# Patient Record
Sex: MALE | Race: WHITE | Employment: FULL TIME | URBAN - METROPOLITAN AREA
[De-identification: names, ages, dates, MRNs, and addresses within clinical notes are randomized per-mention and may not be internally consistent; named-entity substitution may affect disease eponyms.]

---

## 2017-10-05 ENCOUNTER — OFFICE VISIT (OUTPATIENT)
Dept: INTERNAL MEDICINE CLINIC | Facility: CLINIC | Age: 52
End: 2017-10-05

## 2017-10-05 ENCOUNTER — LAB ENCOUNTER (OUTPATIENT)
Dept: LAB | Age: 52
End: 2017-10-05
Attending: FAMILY MEDICINE
Payer: COMMERCIAL

## 2017-10-05 VITALS
TEMPERATURE: 99 F | HEIGHT: 68.5 IN | BODY MASS INDEX: 30.71 KG/M2 | OXYGEN SATURATION: 98 % | DIASTOLIC BLOOD PRESSURE: 70 MMHG | RESPIRATION RATE: 13 BRPM | SYSTOLIC BLOOD PRESSURE: 130 MMHG | HEART RATE: 58 BPM | WEIGHT: 205 LBS

## 2017-10-05 DIAGNOSIS — Z12.5 SPECIAL SCREENING, PROSTATE CANCER: ICD-10-CM

## 2017-10-05 DIAGNOSIS — Z11.59 NEED FOR HEPATITIS C SCREENING TEST: ICD-10-CM

## 2017-10-05 DIAGNOSIS — R07.89 FEELING OF CHEST TIGHTNESS: Primary | ICD-10-CM

## 2017-10-05 DIAGNOSIS — R03.0 ELEVATED BLOOD PRESSURE READING: ICD-10-CM

## 2017-10-05 DIAGNOSIS — Z87.891 FORMER SMOKER: ICD-10-CM

## 2017-10-05 PROBLEM — I10 ESSENTIAL HYPERTENSION: Status: ACTIVE | Noted: 2017-10-05

## 2017-10-05 PROBLEM — I10 ESSENTIAL HYPERTENSION: Status: RESOLVED | Noted: 2017-10-05 | Resolved: 2017-10-05

## 2017-10-05 PROCEDURE — 84153 ASSAY OF PSA TOTAL: CPT | Performed by: FAMILY MEDICINE

## 2017-10-05 PROCEDURE — 80053 COMPREHEN METABOLIC PANEL: CPT | Performed by: FAMILY MEDICINE

## 2017-10-05 PROCEDURE — 36415 COLL VENOUS BLD VENIPUNCTURE: CPT | Performed by: FAMILY MEDICINE

## 2017-10-05 PROCEDURE — 86803 HEPATITIS C AB TEST: CPT | Performed by: FAMILY MEDICINE

## 2017-10-05 PROCEDURE — 99213 OFFICE O/P EST LOW 20 MIN: CPT | Performed by: FAMILY MEDICINE

## 2017-10-05 PROCEDURE — 85025 COMPLETE CBC W/AUTO DIFF WBC: CPT | Performed by: FAMILY MEDICINE

## 2017-10-05 PROCEDURE — 80061 LIPID PANEL: CPT | Performed by: FAMILY MEDICINE

## 2017-10-05 RX ORDER — CLINDAMYCIN AND BENZOYL PEROXIDE 10; 50 MG/G; MG/G
GEL TOPICAL
COMMUNITY
Start: 2017-09-26 | End: 2019-02-06

## 2017-10-05 NOTE — PROGRESS NOTES
HPI:    Patient ID: Dragan Dave is a 47year old male.     HPI  Former pt who comes in for BP ck   [facility]   156/60 last time   Has cked at home   140/80 range   Has 2 cup coffee in the AM  Not today though [E]    Meds This Visit:    No prescriptions requested or ordered in this encounter       Imaging & Referrals:  XR CHEST PA + LAT CHEST (CPT=71020)  CARD ECHO STRESS ECHO/REST AND STRESS (CPT=93351)       RI#[id number]

## 2017-10-26 ENCOUNTER — HOSPITAL ENCOUNTER (OUTPATIENT)
Dept: GENERAL RADIOLOGY | Facility: HOSPITAL | Age: 52
Discharge: HOME | End: 2017-10-26
Attending: FAMILY MEDICINE
Payer: COMMERCIAL

## 2017-10-26 ENCOUNTER — HOSPITAL ENCOUNTER (OUTPATIENT)
Dept: CV DIAGNOSTICS | Facility: HOSPITAL | Age: 52
Discharge: HOME | End: 2017-10-26
Attending: FAMILY MEDICINE
Payer: COMMERCIAL

## 2017-10-26 DIAGNOSIS — R03.0 ELEVATED BLOOD PRESSURE READING: ICD-10-CM

## 2017-10-26 DIAGNOSIS — R07.89 FEELING OF CHEST TIGHTNESS: ICD-10-CM

## 2017-10-26 DIAGNOSIS — Z11.59 NEED FOR HEPATITIS C SCREENING TEST: ICD-10-CM

## 2017-10-26 PROCEDURE — 93018 CV STRESS TEST I&R ONLY: CPT | Performed by: FAMILY MEDICINE

## 2017-10-26 PROCEDURE — 93350 STRESS TTE ONLY: CPT | Performed by: FAMILY MEDICINE

## 2017-10-26 PROCEDURE — 71020 XR CHEST PA + LAT CHEST (CPT=71020): CPT | Performed by: FAMILY MEDICINE

## 2017-10-26 PROCEDURE — 93017 CV STRESS TEST TRACING ONLY: CPT | Performed by: FAMILY MEDICINE

## 2017-11-20 ENCOUNTER — OFFICE VISIT (OUTPATIENT)
Dept: INTERNAL MEDICINE CLINIC | Facility: CLINIC | Age: 52
End: 2017-11-20

## 2017-11-20 VITALS
HEART RATE: 60 BPM | OXYGEN SATURATION: 98 % | HEIGHT: 68.25 IN | BODY MASS INDEX: 31.67 KG/M2 | SYSTOLIC BLOOD PRESSURE: 130 MMHG | TEMPERATURE: 98 F | DIASTOLIC BLOOD PRESSURE: 84 MMHG | RESPIRATION RATE: 16 BRPM | WEIGHT: 209 LBS

## 2017-11-20 DIAGNOSIS — Z00.00 WELLNESS EXAMINATION: Primary | ICD-10-CM

## 2017-11-20 DIAGNOSIS — R06.02 SOB (SHORTNESS OF BREATH): ICD-10-CM

## 2017-11-20 DIAGNOSIS — R20.0 NUMBNESS OF LEFT ANTERIOR THIGH: ICD-10-CM

## 2017-11-20 DIAGNOSIS — N52.9 ERECTILE DYSFUNCTION, UNSPECIFIED ERECTILE DYSFUNCTION TYPE: ICD-10-CM

## 2017-11-20 DIAGNOSIS — R03.0 ELEVATED BLOOD PRESSURE READING WITHOUT DIAGNOSIS OF HYPERTENSION: ICD-10-CM

## 2017-11-20 DIAGNOSIS — M21.612 BUNION, LEFT: ICD-10-CM

## 2017-11-20 DIAGNOSIS — R07.89 CHEST PRESSURE: ICD-10-CM

## 2017-11-20 DIAGNOSIS — I44.7 LBBB (LEFT BUNDLE BRANCH BLOCK): ICD-10-CM

## 2017-11-20 PROCEDURE — 99396 PREV VISIT EST AGE 40-64: CPT | Performed by: FAMILY MEDICINE

## 2017-11-20 PROCEDURE — 99213 OFFICE O/P EST LOW 20 MIN: CPT | Performed by: FAMILY MEDICINE

## 2017-11-20 RX ORDER — SILDENAFIL 100 MG/1
100 TABLET, FILM COATED ORAL
Qty: 10 TABLET | Refills: 1 | Status: SHIPPED | OUTPATIENT
Start: 2017-11-20 | End: 2019-02-06

## 2017-11-20 NOTE — PROGRESS NOTES
HPI:    Patient ID: Pat Rahman is a 47year old male.     HPI  Pat Rahman is a 47year old male who presents for a complete physical exam.   HPI:       Wt Readings from Last 6 Encounters:  11/20/17 : 209 lb  10/05/17 : 205 lb  11/25/14 : 201 lb 4 issues w staying hard    Desire is there     MUSCULOSKELETAL: denies back pain at present but has had in the past     Has L foot bunion  Does not bother him much though  woyld like name of podiatry     NEURO: denies headaches  PSYCHE: denies depression or Current Outpatient Prescriptions:  Sildenafil Citrate 100 MG Oral Tab Take 1 tablet (100 mg total) by mouth daily as needed for Erectile Dysfunction.  Disp: 10 tablet Rfl: 1   Clindamycin Phos-Benzoyl Perox 1-5 % External Gel  Disp:  Rfl:      Allergies

## 2017-11-28 ENCOUNTER — CHARTING TRANS (OUTPATIENT)
Dept: OTHER | Age: 52
End: 2017-11-28

## 2017-11-28 ASSESSMENT — PAIN SCALES - GENERAL: PAINLEVEL_OUTOF10: 5

## 2018-01-15 ENCOUNTER — OFFICE VISIT (OUTPATIENT)
Dept: FAMILY MEDICINE CLINIC | Facility: CLINIC | Age: 53
End: 2018-01-15

## 2018-01-15 VITALS
WEIGHT: 209 LBS | DIASTOLIC BLOOD PRESSURE: 90 MMHG | BODY MASS INDEX: 31.31 KG/M2 | OXYGEN SATURATION: 99 % | RESPIRATION RATE: 20 BRPM | HEART RATE: 69 BPM | SYSTOLIC BLOOD PRESSURE: 142 MMHG | TEMPERATURE: 99 F | HEIGHT: 68.5 IN

## 2018-01-15 DIAGNOSIS — J06.9 VIRAL URI: Primary | ICD-10-CM

## 2018-01-15 DIAGNOSIS — L21.9 SEBORRHEIC DERMATITIS OF SCALP: ICD-10-CM

## 2018-01-15 PROCEDURE — 99213 OFFICE O/P EST LOW 20 MIN: CPT | Performed by: PHYSICIAN ASSISTANT

## 2018-01-15 RX ORDER — KETOCONAZOLE 20 MG/ML
SHAMPOO TOPICAL
Qty: 1 BOTTLE | Refills: 1 | Status: SHIPPED | OUTPATIENT
Start: 2018-01-15 | End: 2019-02-06

## 2018-01-16 NOTE — PATIENT INSTRUCTIONS
Viral Upper Respiratory Illness (Adult)  You have a viral upper respiratory illness (URI), which is another term for the common cold. This illness is contagious during the first few days. It is spread through the air by coughing and sneezing.  It may also Call your healthcare provider right away if any of these occur:  · Cough with lots of colored sputum (mucus)  · Severe headache; face, neck, or ear pain  · Difficulty swallowing due to throat pain  · Fever of 100.4°F (38°C) or higher, or as directed by you You may also have acne, inflamed eyelids (blepharitis), or other skin conditions at the same time. Treatment for seborrheic dermatitis  Treatment can reduce symptoms for a period of time.  The types of treatments most often used include:  · Antifungal sham

## 2018-01-16 NOTE — PROGRESS NOTES
CHIEF COMPLAINT:   Patient presents with:  Nasal Congestion: sinus pressure,itchy throat and post nasal drip x 1 day   note for work    HPI:   Sai Gonzáles is a 47year old male who presents for upper and lower respiratory symptoms for  1 days.  Patient /90   Pulse 69   Temp 98.7 °F (37.1 °C) (Oral)   Resp 20   Ht 68.5\"   Wt 209 lb   SpO2 99%   BMI 31.32 kg/m²   GENERAL: well developed, well nourished, and in no apparent distress  SCALP: erythematous papules, some excoriated with overlying yellow c You have a viral upper respiratory illness (URI), which is another term for the common cold. This illness is contagious during the first few days. It is spread through the air by coughing and sneezing.  It may also be spread by direct contact (touching the · Cough with lots of colored sputum (mucus)  · Severe headache; face, neck, or ear pain  · Difficulty swallowing due to throat pain  · Fever of 100.4°F (38°C) or higher, or as directed by your healthcare provider  Call 911  Call 911 if any of these occur: Treatment for seborrheic dermatitis  Treatment can reduce symptoms for a period of time.  The types of treatments most often used include:  · Antifungal shampoo, body wash, or cream. These contain medicines such as ketoconazole, fluconazole, selenium sulfid

## 2018-05-21 ENCOUNTER — OFFICE VISIT (OUTPATIENT)
Dept: FAMILY MEDICINE CLINIC | Facility: CLINIC | Age: 53
End: 2018-05-21

## 2018-05-21 VITALS
TEMPERATURE: 99 F | HEART RATE: 89 BPM | HEIGHT: 66.5 IN | BODY MASS INDEX: 33.19 KG/M2 | SYSTOLIC BLOOD PRESSURE: 130 MMHG | OXYGEN SATURATION: 98 % | RESPIRATION RATE: 20 BRPM | WEIGHT: 209 LBS | DIASTOLIC BLOOD PRESSURE: 82 MMHG

## 2018-05-21 DIAGNOSIS — J06.9 VIRAL UPPER RESPIRATORY INFECTION: ICD-10-CM

## 2018-05-21 DIAGNOSIS — L21.9 SEBORRHEIC DERMATITIS OF SCALP: Primary | ICD-10-CM

## 2018-05-21 PROCEDURE — 99213 OFFICE O/P EST LOW 20 MIN: CPT | Performed by: NURSE PRACTITIONER

## 2018-05-21 RX ORDER — KETOCONAZOLE 20 MG/ML
10 SHAMPOO TOPICAL
Qty: 1 BOTTLE | Refills: 0 | Status: SHIPPED | OUTPATIENT
Start: 2018-05-21 | End: 2018-06-20

## 2018-05-21 NOTE — PATIENT INSTRUCTIONS
Understanding Seborrheic Dermatitis    Seborrheic dermatitis is a common type of rash that causes red, scaly, greasy skin.  It occurs on skin that has oil glands, such as the face, scalp, and upper chest. It tends to last a long time, or go away and come Wash your skin gently. You can remove scales with oil and gentle rubbing or a brush. Living with seborrheic dermatitis  Seborrheic dermatitis is an ongoing (chronic) condition. It can go away and then come back.  You will likely need to use shampoo, cream, · You may use acetaminophen or ibuprofen to control pain and fever, unless another medicine was prescribed. If you have chronic liver or kidney disease, have ever had a stomach ulcer or gastrointestinal bleeding, or are taking blood-thinning medicines, clovis

## 2018-05-21 NOTE — PROGRESS NOTES
CHIEF COMPLAINT:   Patient presents with:  URI: water eyes, sinus pressure x 2 days    HPI:   Marlo Pate is a 46year old male who presents for ill symptoms for 2 days.  Patient reports sore throat only at the beginning of sx's, congestion, OTC cold me Scalp: erythematous, some excoriated with overlying yellow crusting present throughout small portions of scalp, no edema, warmth, active drainage, or tenderness to palpation. HEENT: atraumatic, normocephalic. conjunctiva clear.  TM's gray, no bulging, no Risks, benefits, and side effects of medication explained and discussed. Patient Instructions       Understanding Seborrheic Dermatitis    Seborrheic dermatitis is a common type of rash that causes red, scaly, greasy skin.  It occurs on skin that has oil · Shampoo or cream with other medicines. These contain medicines such as coal tar, salicylic acid, or zinc pyrithione. · Sodium sulfacetemide creams and washes. These may also help. Wash your skin gently.  You can remove scales with oil and gentle rubbing · You may use acetaminophen or ibuprofen to control pain and fever, unless another medicine was prescribed. If you have chronic liver or kidney disease, have ever had a stomach ulcer or gastrointestinal bleeding, or are taking blood-thinning medicines, clovis

## 2018-11-02 VITALS
SYSTOLIC BLOOD PRESSURE: 130 MMHG | DIASTOLIC BLOOD PRESSURE: 78 MMHG | HEART RATE: 74 BPM | TEMPERATURE: 98.2 F | RESPIRATION RATE: 20 BRPM

## 2019-02-06 ENCOUNTER — OFFICE VISIT (OUTPATIENT)
Dept: FAMILY MEDICINE CLINIC | Facility: CLINIC | Age: 54
End: 2019-02-06
Payer: COMMERCIAL

## 2019-02-06 VITALS
DIASTOLIC BLOOD PRESSURE: 70 MMHG | SYSTOLIC BLOOD PRESSURE: 130 MMHG | BODY MASS INDEX: 33 KG/M2 | WEIGHT: 207.38 LBS | RESPIRATION RATE: 20 BRPM | OXYGEN SATURATION: 97 % | TEMPERATURE: 100 F | HEART RATE: 86 BPM

## 2019-02-06 DIAGNOSIS — J11.1 INFLUENZA-LIKE ILLNESS: Primary | ICD-10-CM

## 2019-02-06 DIAGNOSIS — L70.8 OTHER ACNE: ICD-10-CM

## 2019-02-06 DIAGNOSIS — L21.9 SEBORRHEIC DERMATITIS OF SCALP: ICD-10-CM

## 2019-02-06 PROCEDURE — 99213 OFFICE O/P EST LOW 20 MIN: CPT | Performed by: NURSE PRACTITIONER

## 2019-02-06 RX ORDER — CLINDAMYCIN AND BENZOYL PEROXIDE 10; 50 MG/G; MG/G
1 GEL TOPICAL 2 TIMES DAILY
Qty: 50 G | Refills: 0 | Status: SHIPPED | OUTPATIENT
Start: 2019-02-06 | End: 2019-02-20

## 2019-02-06 RX ORDER — KETOCONAZOLE 20 MG/ML
30 SHAMPOO TOPICAL
Qty: 1 BOTTLE | Refills: 1 | Status: SHIPPED | OUTPATIENT
Start: 2019-02-07 | End: 2019-04-04

## 2019-02-06 NOTE — PATIENT INSTRUCTIONS
Humidifier in room  Sleep propped  Push fluids  Limit dairy  Mucinex as directed  Sudafed as needed  Follow up for worsening symptoms or no improvement    Understanding Seborrheic Dermatitis    Seborrheic dermatitis is a common type of rash that causes r · Shampoo or cream with other medicines. These contain medicines such as coal tar, salicylic acid, or zinc pyrithione. · Sodium sulfacetemide creams and washes. These may also help. Wash your skin gently.  You can remove scales with oil and gentle rubbing 2. Follow the treatment plan advised by your healthcare provider. It usually takes 2 to 3 months to see a result. 3. Switching from oil-based to water-based makeup may improve acne in girls.   Follow-up care  Follow up with your healthcare provider, or as · Nausea and loss of appetite are common with the flu. Eat light meals. Drink 6 to 8 glasses of liquids every day. Good choices are water, sport drinks, soft drinks without caffeine, juices, tea, and soup.  Extra fluids will also help loosen secretions in y

## 2019-02-06 NOTE — PROGRESS NOTES
Patient presents with:  Flu: headache, chills, slight bodyaches, cough, face congestion, x 1 day   :    HPI:   Richie Veras is a 53year old male who presents for upper respiratory symptoms for  10  days. Started suddenly.   Symptoms have been worsening /70 (BP Location: Left arm, Patient Position: Sitting, Cuff Size: large)   Pulse 86   Temp 100.2 °F (37.9 °C) (Oral)   Resp 20   Wt 207 lb 6.4 oz   SpO2 97%   BMI 32.97 kg/m²   GENERAL: well developed, well nourished, in no apparent distress, acutely The patient is asked to f/u with PCP in 3-4 days if sx's persist. Seek immediate medical attention for acute or worsening symptoms. The patient indicates understanding of these issues and agrees to the plan.     Meds & Refills for this Visit:  Requested Pre · On the scalp, also known as dandruff  · On the upper chest  You may also have acne, inflamed eyelids (blepharitis), or other skin conditions at the same time. Treatment for seborrheic dermatitis  Treatment can reduce symptoms for a period of time.  The t Acne is an inflammatory condition of the skin. During adolescence (especially in boys) there is an increase in production of skin oils on the face, neck, chest, upper back, and upper arms.  These oils can block hair follicles and allow an overgrowth of norm Influenza is also called the flu. It is a viral illness that affects the air passages of your lungs. It is different from the common cold. The flu can easily be passed from one to person to another.  It may be spread through the air by coughing and sneezing If you are age 72 or older, talk with your provider about getting a pneumococcal vaccine every 5 years. You should also get this vaccine if you have chronic asthma or COPD. All adults should get a flu vaccine every fall. Ask your provider about this.   When

## 2020-04-20 ENCOUNTER — VIRTUAL PHONE E/M (OUTPATIENT)
Dept: INTERNAL MEDICINE CLINIC | Facility: CLINIC | Age: 55
End: 2020-04-20
Payer: COMMERCIAL

## 2020-04-20 ENCOUNTER — TELEPHONE (OUTPATIENT)
Dept: INTERNAL MEDICINE CLINIC | Facility: CLINIC | Age: 55
End: 2020-04-20

## 2020-04-20 DIAGNOSIS — Z20.822 SUSPECTED 2019 NOVEL CORONAVIRUS INFECTION: Primary | ICD-10-CM

## 2020-04-20 PROCEDURE — 99441 PHONE E/M BY PHYS 5-10 MIN: CPT | Performed by: FAMILY MEDICINE

## 2020-04-20 NOTE — TELEPHONE ENCOUNTER
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

## 2020-04-20 NOTE — PROGRESS NOTES
Virtual Telephone Check-In    Guero Paulino verbally consents to a Virtual/Telephone Check-In visit on 04/20/20. Patient understands and accepts financial responsibility for any deductible, co-insurance and/or co-pays associated with this service. Imaging & Consults:  None      1. Self quarantine at home for the next 7-14 days or until 3 days fever free without fever reducing medications; whichever is longer. 2. Take acetaminophen for fever or body aches 650 mg every 6 hours as needed.  Be mcginnis

## 2020-04-20 NOTE — TELEPHONE ENCOUNTER
Patient called and stated that he had a headache, fever and chills that started on Sunday. He stated that his job policy is that if you are showing symptoms that he would get 3 days off work. He doesn't know if he should really be off of work for 2 weeks.

## 2020-04-27 ENCOUNTER — TELEPHONE (OUTPATIENT)
Dept: INTERNAL MEDICINE CLINIC | Facility: CLINIC | Age: 55
End: 2020-04-27

## 2020-04-27 NOTE — TELEPHONE ENCOUNTER
Patient dropped off paperwork for FMLA at our office door for Bianca Macedo to complete; Fri 4/24/20 he is calling for status of receiving it? Please check Chelsey's in box to see if she has received to be completed and callback patient.   Once completed he st

## 2020-04-27 NOTE — TELEPHONE ENCOUNTER
Left detailed voicemail to verify Rolando Wasserman did receive FMLA paperwork it is on her desk waiting to be completed; did not verify time to complete but did confirm we would call him when scanned into his Patient Feedt for him to print

## 2022-12-18 ENCOUNTER — TELEPHONE (OUTPATIENT)
Dept: FAMILY MEDICINE CLINIC | Facility: CLINIC | Age: 57
End: 2022-12-18

## 2022-12-18 NOTE — TELEPHONE ENCOUNTER
FYI- On call message received today at 6 am. Called coroners office back at 8 am. Patient passed away last night. Patient needed to know last office visit date and past medical history- information was provided (last physical exam 2017 with Dr. Karey Landers, telemedicine [facility] visit with Sally Sharpe NP 2017).

## (undated) NOTE — LETTER
Date: 5/21/2018    Patient Name: Marco Ruano          To Whom it may concern: The above patient was seen at the [facility] for treatment of a medical condition. This patient should be excused from attending work on 5/21.     The ciro

## (undated) NOTE — LETTER
Date: 2/6/2019    Patient Name: Dragan Dave          To Whom it may concern: This letter has been written at the patient's request. The above patient was seen at the [facility] for treatment of a medical condition.     The patient may r

## (undated) NOTE — LETTER
April 20, 2020    Patient: Megha Cedeno   YOB: 1965     Dear Employer,  At [place] [id number], we are taking special precautions and doing everything we can to prevent the spread of COVID-19 (coronavirus disease 2019).  During this high-risk patients (including, but not limited to, age over 61, immunosuppressed status due to disease or medication, chronic respiratory or heart conditions and diabetes). ?  During the social distancing period imposed by the [place] in March, a

## (undated) NOTE — LETTER
Date: 1/15/2018    Patient Name: Adele Bloch          To Whom it may concern: This letter has been written at the patient's request. The above patient was seen at the [facility] for treatment of a medical condition.     This patient thao